# Patient Record
Sex: MALE | URBAN - METROPOLITAN AREA
[De-identification: names, ages, dates, MRNs, and addresses within clinical notes are randomized per-mention and may not be internally consistent; named-entity substitution may affect disease eponyms.]

---

## 2023-04-11 ENCOUNTER — APPOINTMENT (RX ONLY)
Dept: URBAN - METROPOLITAN AREA CLINIC 94 | Facility: CLINIC | Age: 68
Setting detail: DERMATOLOGY
End: 2023-04-11

## 2023-04-11 PROBLEM — D04.61 CARCINOMA IN SITU OF SKIN OF RIGHT UPPER LIMB, INCLUDING SHOULDER: Status: ACTIVE | Noted: 2023-04-11

## 2023-04-11 PROCEDURE — ? ADDITIONAL NOTES

## 2023-04-11 PROCEDURE — 99213 OFFICE O/P EST LOW 20 MIN: CPT

## 2023-04-11 PROCEDURE — ? COUNSELING

## 2023-04-11 PROCEDURE — ? PRESCRIPTION MEDICATION MANAGEMENT

## 2023-04-11 NOTE — PROCEDURE: ADDITIONAL NOTES
Render Risk Assessment In Note?: no
Detail Level: Simple
Additional Notes: Counseled patient that he will need radiation therapy with  in [address] [place] [address], or Mohâs surgery with Kalina Elias

## 2023-04-11 NOTE — PROCEDURE: PRESCRIPTION MEDICATION MANAGEMENT
Plan: worsening\\nbiopsy proven march 2, 2022\\nrefer to dr. Lala Barrera for radiation
Discontinue Regimen: efudex 5% cream as did not completely clear skin cancer on thumb
Render In Strict Bullet Format?: No
Detail Level: Zone

## 2023-04-11 NOTE — HPI: FOLLOW UP OTHER
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

## 2023-04-20 ENCOUNTER — APPOINTMENT (RX ONLY)
Dept: URBAN - METROPOLITAN AREA CLINIC 102 | Facility: CLINIC | Age: 68
Setting detail: DERMATOLOGY
End: 2023-04-20

## 2023-04-20 PROCEDURE — ? CONSULTATION FOR ELECTRON BEAM THERAPY

## 2023-04-20 PROCEDURE — ? PATIENT SPECIFIC COUNSELING

## 2023-04-20 NOTE — PROCEDURE: CONSULTATION FOR ELECTRON BEAM THERAPY
Detail Level: Detailed
Other Plan: We discussed a 4 to 6 week treatment plan
Previous Chemotherapy History: No
Referring Provider: Sherman Franco
Body Location Override (Optional - Billing Will Still Be Based On Selected Body Map Location If Applicable): right thumb
X Size Of Lesion In Cm (Optional): 0